# Patient Record
Sex: MALE | Race: ASIAN
[De-identification: names, ages, dates, MRNs, and addresses within clinical notes are randomized per-mention and may not be internally consistent; named-entity substitution may affect disease eponyms.]

---

## 2018-01-24 ENCOUNTER — HOSPITAL ENCOUNTER (EMERGENCY)
Dept: HOSPITAL 76 - ED | Age: 23
Discharge: HOME | End: 2018-01-24
Payer: COMMERCIAL

## 2018-01-24 VITALS — SYSTOLIC BLOOD PRESSURE: 137 MMHG | DIASTOLIC BLOOD PRESSURE: 83 MMHG

## 2018-01-24 DIAGNOSIS — G43.009: Primary | ICD-10-CM

## 2018-01-24 LAB
ALBUMIN DIAFP-MCNC: 4.7 G/DL (ref 3.2–5.5)
ALBUMIN/GLOB SERPL: 1.5 {RATIO} (ref 1–2.2)
ALP SERPL-CCNC: 41 IU/L (ref 42–121)
ALT SERPL W P-5'-P-CCNC: 32 IU/L (ref 10–60)
ANION GAP SERPL CALCULATED.4IONS-SCNC: 12 MMOL/L (ref 6–13)
AST SERPL W P-5'-P-CCNC: 26 IU/L (ref 10–42)
BASOPHILS NFR BLD AUTO: 0 10^3/UL (ref 0–0.1)
BASOPHILS NFR BLD AUTO: 0.7 %
BILIRUB BLD-MCNC: 0.7 MG/DL (ref 0.2–1)
BUN SERPL-MCNC: 12 MG/DL (ref 6–20)
CALCIUM UR-MCNC: 9.5 MG/DL (ref 8.5–10.3)
CHLORIDE SERPL-SCNC: 99 MMOL/L (ref 101–111)
CO2 SERPL-SCNC: 27 MMOL/L (ref 21–32)
CREAT SERPLBLD-SCNC: 0.7 MG/DL (ref 0.6–1.2)
EOSINOPHIL # BLD AUTO: 0.1 10^3/UL (ref 0–0.7)
EOSINOPHIL NFR BLD AUTO: 2.5 %
ERYTHROCYTE [DISTWIDTH] IN BLOOD BY AUTOMATED COUNT: 13.5 % (ref 12–15)
GFRSERPLBLD MDRD-ARVRAT: 142 ML/MIN/{1.73_M2} (ref 89–?)
GLOBULIN SER-MCNC: 3.1 G/DL (ref 2.1–4.2)
GLUCOSE SERPL-MCNC: 87 MG/DL (ref 70–100)
HGB UR QL STRIP: 15.5 G/DL (ref 14–18)
LIPASE SERPL-CCNC: 26 U/L (ref 22–51)
LYMPHOCYTES # SPEC AUTO: 1.1 10^3/UL (ref 1.5–3.5)
LYMPHOCYTES NFR BLD AUTO: 27.2 %
MCH RBC QN AUTO: 27.9 PG (ref 27–31)
MCHC RBC AUTO-ENTMCNC: 34.6 G/DL (ref 32–36)
MCV RBC AUTO: 80.6 FL (ref 80–94)
MONOCYTES # BLD AUTO: 0.7 10^3/UL (ref 0–1)
MONOCYTES NFR BLD AUTO: 17.5 %
NEUTROPHILS # BLD AUTO: 2.1 10^3/UL (ref 1.5–6.6)
NEUTROPHILS # SNV AUTO: 4 X10^3/UL (ref 4.8–10.8)
NEUTROPHILS NFR BLD AUTO: 52.1 %
PDW BLD AUTO: 7.5 FL (ref 7.4–11.4)
PLATELET # BLD: 232 10^3/UL (ref 130–450)
PROT SPEC-MCNC: 7.8 G/DL (ref 6.7–8.2)
RBC MAR: 5.54 10^6/UL (ref 4.7–6.1)
SODIUM SERPLBLD-SCNC: 138 MMOL/L (ref 135–145)

## 2018-01-24 PROCEDURE — 80053 COMPREHEN METABOLIC PANEL: CPT

## 2018-01-24 PROCEDURE — 85651 RBC SED RATE NONAUTOMATED: CPT

## 2018-01-24 PROCEDURE — 96375 TX/PRO/DX INJ NEW DRUG ADDON: CPT

## 2018-01-24 PROCEDURE — 96361 HYDRATE IV INFUSION ADD-ON: CPT

## 2018-01-24 PROCEDURE — 85025 COMPLETE CBC W/AUTO DIFF WBC: CPT

## 2018-01-24 PROCEDURE — 99284 EMERGENCY DEPT VISIT MOD MDM: CPT

## 2018-01-24 PROCEDURE — 70450 CT HEAD/BRAIN W/O DYE: CPT

## 2018-01-24 PROCEDURE — 96374 THER/PROPH/DIAG INJ IV PUSH: CPT

## 2018-01-24 PROCEDURE — 83690 ASSAY OF LIPASE: CPT

## 2018-01-24 PROCEDURE — 36415 COLL VENOUS BLD VENIPUNCTURE: CPT

## 2018-01-24 NOTE — ED PHYSICIAN DOCUMENTATION
PD HPI HEADACHE





- Stated complaint


Stated Complaint: HEADACHE





- Chief complaint


Chief Complaint: Neuro





- History obtained from


History obtained from: Patient





- History of Present Illness


Timing - onset: How many hours ago (1), Today


Timing - onset during: Light activity


Timing - details: Abrupt onset (he had onset of left headache mildly and then 

gradually worse over several minutes, then worsened to point of distracting him 

from walking as he got up and was walking across the street. Denies injury. No 

recent URI. Has nausea and some visual disturbance left lateral eye field.)


Location: Front, Left


Quality: Throbbing, Aching.  No: Thunderclap


Associated symptoms: Nausea, Vision changes.  No: Fever, Stiff neck, Weakness, 

Numbness


Improved by: No: Rest


Worsened by: Light


Contributing factors: No: Anticoagulated, Possible carbon monoxide, Recent 

illness, Trauma


Similar symptoms before: No diagnosis (similar episode that lasted about a day 

several months ago. Interval time without headaches.)


Recently seen: Not recently seen





Review of Systems


Constitutional: denies: Fever, Chills


Nose: denies: Rhinorrhea / runny nose, Congestion


Throat: denies: Sore throat


Cardiac: denies: Chest pain / pressure


Respiratory: denies: Cough


GI: reports: Nausea.  denies: Abdominal Pain, Diarrhea


Skin: denies: Rash, Lesions


Neurologic: denies: Focal weakness, Numbness, Near syncope





PD PAST MEDICAL HISTORY





- Past Medical History


Past Medical History: No


Neuro: None





- Past Surgical History


Past Surgical History: No





- Present Medications


Home Medications: 


 Ambulatory Orders











 Medication  Instructions  Recorded  Confirmed


 


Ibuprofen [Motrin] 600 mg PO TID #20 tab 01/24/18 


 


Ondansetron Odt [Zofran] 4 mg TL Q6H PRN #15 tablet 01/24/18 


 


Tramadol HCl 50 mg PO Q6H PRN #20 tablet 01/24/18 














- Allergies


Allergies/Adverse Reactions: 


 Allergies











Allergy/AdvReac Type Severity Reaction Status Date / Time


 


No Known Drug Allergies Allergy   Verified 01/24/18 13:15














- Social History


Does the pt smoke?: No


Smoking Status: Never smoker





- Immunizations


Immunizations are current?: Yes





PD ED PE NORMAL





- Vitals


Vital signs reviewed: Yes





- General


General: Alert and oriented X 3, No acute distress, Well developed/nourished





- HEENT


HEENT: PERRL, EOMI (light sensitive), Pharynx benign





- Neck


Neck: Supple, no meningeal sign, No adenopathy





- Cardiac


Cardiac: RRR, No murmur





- Respiratory


Respiratory: Clear bilaterally





- Abdomen


Abdomen: Soft, Non tender





- Back


Back: No CVA TTP





- Derm


Derm: Normal color, Warm and dry, No rash





- Neuro


Neuro: Alert and oriented X 3, CNs 2-12 intact, No motor deficit, No sensory 

deficit, Normal speech, Other


Eye Opening: Spontaneous


Motor: Obeys Commands


Verbal: Oriented


GCS Score: 15





- Psych


Psych: Normal mood, Normal affect





Results





- Vitals


Vitals: 


 Oxygen











O2 Source                      Room air

















- Labs


Labs: 


 Laboratory Tests











  01/24/18 01/24/18 01/24/18





  13:56 13:56 13:56


 


WBC  4.0 L  


 


RBC  5.54  


 


Hgb  15.5  


 


Hct  44.6  


 


MCV  80.6  


 


MCH  27.9  


 


MCHC  34.6  


 


RDW  13.5  


 


Plt Count  232  


 


MPV  7.5  


 


Neut #  2.1  


 


Lymph #  1.1 L  


 


Mono #  0.7  


 


Eos #  0.1  


 


Baso #  0.0  


 


Absolute Nucleated RBC  0.00  


 


Nucleated RBC %  0.1  


 


ESR    1


 


Sodium   138 


 


Potassium   3.5 


 


Chloride   99 L 


 


Carbon Dioxide   27 


 


Anion Gap   12.0 


 


BUN   12 


 


Creatinine   0.7 


 


Estimated GFR (MDRD)   142 


 


Glucose   87 


 


Calcium   9.5 


 


Total Bilirubin   0.7 


 


AST   26 


 


ALT   32 


 


Alkaline Phosphatase   41 L 


 


Total Protein   7.8 


 


Albumin   4.7 


 


Globulin   3.1 


 


Albumin/Globulin Ratio   1.5 


 


Lipase   26 














- Rads (name of study)


  ** head CT


Radiology: Prelim report reviewed (no acute process)





PD MEDICAL DECISION MAKING





- ED course


Complexity details: re-evaluated patient (improved with meds targeting migraine.

), considered differential (sounds like migraine though more quick onset. He is 

here soon after onset and has normal head CT, so shared discussion aobout 

sufficient for SAH and not doing LP based on current literature. ), d/w patient





Departure





- Departure


Disposition: 01 Home, Self Care


Clinical Impression: 


Headache, acute


Qualifiers:


 Headache type: unspecified Intractability: not intractable Qualified Code(s): 

R51 - Headache





Migraine


Qualifiers:


 Migraine type: without aura Status migrainosus presence: without status 

migrainosus Intractability: not intractable Qualified Code(s): G43.009 - 

Migraine without aura, not intractable, without status migrainosus





Condition: Stable


Record reviewed to determine appropriate education?: Yes


Instructions:  ED Headache Migraine


[facility] 


[facility]


Prescriptions: 


Ibuprofen [Motrin] 600 mg PO TID #20 tab


Ondansetron Odt [Zofran] 4 mg TL Q6H PRN #15 tablet


 PRN Reason: Nausea / Vomiting


Tramadol HCl 50 mg PO Q6H PRN #20 tablet


 PRN Reason: Pain


Comments: 


Home and rest the rest of today.  Drink lots of fluids.  This sounds like a 

migraine type headache.  Your CT scan and basic blood tests are normal.  You 

can likely expect other episodic headaches similar to this in the near future.  

This could be weeks or months from now.  If you do not have one that would be 

fine too.  For future headaches like this, you can try ondansetron for nausea 

and ibuprofen and along with that add tramadol if needed for pain.  Follow-up 

with your primary care if these are not adequate for the headaches.  Return if 

other symptoms develop.


Forms:  Activity restrictions


Discharge Date/Time: 01/24/18 15:40

## 2018-01-24 NOTE — CT REPORT
EXAM:

CT HEAD

 

EXAM DATE: 1/24/2018 01:47 PM.

 

CLINICAL HISTORY: Acute headache and dizziness.

 

COMPARISON: None.

 

TECHNIQUE: Multiaxial CT images were obtained from the foramen magnum to the vertex. Reformats: Coron
al. IV contrast: None.

 

In accordance with CT protocol optimization, one or more of the following dose reduction techniques w
ere utilized for this exam: automated exposure control, adjustment of mA and/or KV based on patient s
ize, or use of iterative reconstructive technique.

 

FINDINGS:

Parenchyma: No intraparenchymal hemorrhage. No evidence of mass, midline shift, or CT findings of inf
arction. Gray-white differentiation is distinct. 

 

Extraaxial Spaces: Normal for age. No subdural or epidural collections identified.

 

Ventricles: Normal in size and position.

 

Sinuses and Orbits: Single opacified left posterior ethmoid air cell, paranasal sinuses and temporal 
bone air spaces otherwise appear clear.

 

Bones: No evidence of fracture or calvarial defect.

 

Other: None.

 

IMPRESSION: No CT evidence for acute intracranial abnormality.

 

[facility]

Referring Provider Line: [phone]

 

SITE ID: [id number]

## 2018-11-09 ENCOUNTER — HOSPITAL ENCOUNTER (OUTPATIENT)
Dept: HOSPITAL 76 - DI | Age: 23
Discharge: HOME | End: 2018-11-09
Attending: GENERAL PRACTICE
Payer: COMMERCIAL

## 2018-11-09 DIAGNOSIS — R55: Primary | ICD-10-CM

## 2018-11-09 PROCEDURE — 70553 MRI BRAIN STEM W/O & W/DYE: CPT

## 2018-11-09 NOTE — MRI REPORT
Reason:  SYNCOPE AND COLLAPSE

Procedure Date:  11/09/2018   

Accession Number:  [id number] / [id number]                    

Procedure:  MRI - Brain W/WO CPT Code:  

 

FULL RESULT:

 

 

EXAM:

MRI BRAIN WITHOUT AND WITH CONTRAST

 

EXAM DATE: 11/9/2018 01:32 PM.

 

CLINICAL HISTORY: Syncope and collapse. Possible seizures.

 

COMPARISON: CT head without contrast 01/24/2018.

 

TECHNIQUE: Multiplanar, multisequence T1-weighted and fluid-sensitive MR 

sequences of the brain were performed. Sequences optimized for 

seizure/epilepsy evaluation. Other: 1.5 Carmen. IV Contrast: 7.5 mL IV 

Gadavist.

 

FINDINGS:

Diffusion weighted sequence shows no evidence for acute infarct.  There 

is no mass, mass effect, midline shift or abnormal extraaxial fluid 

collection.

 

Size and configuration of the ventricles are normal.

 

There is a nonenhancing 3.8 mm rounded T2 hyperintensity in the right 

globus pallidus internus posterior inferior lateral to the anterior 

commissure.

 

Signal in the cortex and white matter appears otherwise normal.  

Brainstem and cerebellum appear normal. Normal signal and morphology of 

bilateral hippocampi, amygdala, and parahippocampal gyri. No evidence to 

suggest mesial temporal or hippocampal sclerosis. No evidence to suggest 

cortical dysplasia, or gray matter heterotopia.

 

Major intracranial flow voids appear normal.  Limited evaluation of the 

arteries and dural venous sinus structures on postcontrast imaging 

appears normal.

 

Globes, orbits, optic nerve sheath complex, optic chiasm, pituitary, 

cavernous sinus and Meckel's cave appear normal.

 

Paranasal sinuses and mastoid air cells appear well aerated.

 

Marrow signal and extracranial soft tissue appear normal.

 

Craniocervical junction and visualized upper cervical cord appear 

unremarkable.

IMPRESSION:

1. Nonenhancing, nonspecific 3.8 mm non-mass-like T2 hyperintensity in 

the deep right cerebrum, superimposed over the anterior medial globus 

pallidus internus or genu of the right internal capsule. This is of 

uncertain etiology. Unilateral pallidal lesion is rare, typically from 

trauma or anoxic event and expected to correlate with dystonia. Clinical 

correlation suggested.  Follow-up MRI of the brain without and with 

contrast suggested to exclude early presentation of glioma/neoplasm. 

Other consideration would include atypical demyelinating lesion. 

Occasionally, nonspecific lesions associated with neurofibromatosis type 

I (typically termed "unidentified bright object" in literature) may 

present with this appearance in this location.

2. Otherwise normal MRI of the brain without and with contrast.

 

[facility]

## 2019-02-15 ENCOUNTER — HOSPITAL ENCOUNTER (OUTPATIENT)
Dept: HOSPITAL 76 - EMS | Age: 24
Discharge: ANOTHER HOSPITAL | End: 2019-02-15
Attending: SURGERY
Payer: COMMERCIAL

## 2019-02-15 DIAGNOSIS — R51: ICD-10-CM

## 2019-02-15 DIAGNOSIS — R55: Primary | ICD-10-CM

## 2019-06-07 ENCOUNTER — HOSPITAL ENCOUNTER (EMERGENCY)
Dept: HOSPITAL 76 - ED | Age: 24
Discharge: HOME | End: 2019-06-07
Payer: COMMERCIAL

## 2019-06-07 ENCOUNTER — HOSPITAL ENCOUNTER (OUTPATIENT)
Dept: HOSPITAL 76 - EMS | Age: 24
Discharge: ANOTHER HOSPITAL | End: 2019-06-07
Attending: SURGERY
Payer: COMMERCIAL

## 2019-06-07 VITALS — DIASTOLIC BLOOD PRESSURE: 91 MMHG | SYSTOLIC BLOOD PRESSURE: 136 MMHG

## 2019-06-07 DIAGNOSIS — G40.909: ICD-10-CM

## 2019-06-07 DIAGNOSIS — R56.9: Primary | ICD-10-CM

## 2019-06-07 DIAGNOSIS — R55: Primary | ICD-10-CM

## 2019-06-07 DIAGNOSIS — R51: ICD-10-CM

## 2019-06-07 LAB
ALBUMIN DIAFP-MCNC: 4.9 G/DL (ref 3.2–5.5)
ALBUMIN/GLOB SERPL: 1.5 {RATIO} (ref 1–2.2)
ALP SERPL-CCNC: 46 IU/L (ref 42–121)
ALT SERPL W P-5'-P-CCNC: 24 IU/L (ref 10–60)
ANION GAP SERPL CALCULATED.4IONS-SCNC: 12 MMOL/L (ref 6–13)
AST SERPL W P-5'-P-CCNC: 27 IU/L (ref 10–42)
BASOPHILS NFR BLD AUTO: 0 10^3/UL (ref 0–0.1)
BASOPHILS NFR BLD AUTO: 0.9 %
BILIRUB BLD-MCNC: 0.6 MG/DL (ref 0.2–1)
BUN SERPL-MCNC: 9 MG/DL (ref 6–20)
CALCIUM UR-MCNC: 9.7 MG/DL (ref 8.5–10.3)
CHLORIDE SERPL-SCNC: 103 MMOL/L (ref 101–111)
CO2 SERPL-SCNC: 24 MMOL/L (ref 21–32)
CREAT SERPLBLD-SCNC: 0.8 MG/DL (ref 0.6–1.2)
EOSINOPHIL # BLD AUTO: 0.2 10^3/UL (ref 0–0.7)
EOSINOPHIL NFR BLD AUTO: 3 %
ERYTHROCYTE [DISTWIDTH] IN BLOOD BY AUTOMATED COUNT: 13.4 % (ref 12–15)
GFRSERPLBLD MDRD-ARVRAT: 121 ML/MIN/{1.73_M2} (ref 89–?)
GLOBULIN SER-MCNC: 3.2 G/DL (ref 2.1–4.2)
GLUCOSE SERPL-MCNC: 96 MG/DL (ref 70–100)
HGB UR QL STRIP: 15.6 G/DL (ref 14–18)
LIPASE SERPL-CCNC: 26 U/L (ref 22–51)
LYMPHOCYTES # SPEC AUTO: 0.9 10^3/UL (ref 1.5–3.5)
LYMPHOCYTES NFR BLD AUTO: 17.4 %
MCH RBC QN AUTO: 27.4 PG (ref 27–31)
MCHC RBC AUTO-ENTMCNC: 32.9 G/DL (ref 32–36)
MCV RBC AUTO: 83.3 FL (ref 80–94)
MONOCYTES # BLD AUTO: 0.4 10^3/UL (ref 0–1)
MONOCYTES NFR BLD AUTO: 7.3 %
NEUTROPHILS # BLD AUTO: 3.5 10^3/UL (ref 1.5–6.6)
NEUTROPHILS # SNV AUTO: 4.9 X10^3/UL (ref 4.8–10.8)
NEUTROPHILS NFR BLD AUTO: 71.4 %
PDW BLD AUTO: 7.9 FL (ref 7.4–11.4)
PLATELET # BLD: 250 10^3/UL (ref 130–450)
PROT SPEC-MCNC: 8.1 G/DL (ref 6.7–8.2)
RBC MAR: 5.7 10^6/UL (ref 4.7–6.1)
SODIUM SERPLBLD-SCNC: 139 MMOL/L (ref 135–145)

## 2019-06-07 PROCEDURE — 83690 ASSAY OF LIPASE: CPT

## 2019-06-07 PROCEDURE — 85025 COMPLETE CBC W/AUTO DIFF WBC: CPT

## 2019-06-07 PROCEDURE — 36415 COLL VENOUS BLD VENIPUNCTURE: CPT

## 2019-06-07 PROCEDURE — 70450 CT HEAD/BRAIN W/O DYE: CPT

## 2019-06-07 PROCEDURE — 93005 ELECTROCARDIOGRAM TRACING: CPT

## 2019-06-07 PROCEDURE — 99284 EMERGENCY DEPT VISIT MOD MDM: CPT

## 2019-06-07 PROCEDURE — 80053 COMPREHEN METABOLIC PANEL: CPT

## 2019-06-07 NOTE — CT REPORT
Reason:  headache syncope

Procedure Date:  06/07/2019   

Accession Number:  [id number] / [id number]                    

Procedure:  CT  - HEAD WO CPT Code:  

 

FULL RESULT:

 

 

EXAM:

CT HEAD

 

EXAM DATE: 6/7/2019 12:48 PM.

 

CLINICAL HISTORY: Headache syncope.

 

COMPARISON: HEAD W/O 01/24/2018 1:44 PM.

 

TECHNIQUE: Multiaxial CT images were obtained from the foramen magnum to 

the vertex. Reformats: Sagittal and coronal. IV contrast: None.

 

In accordance with CT protocol optimization, one or more of the following 

dose reduction techniques were utilized for this exam: automated exposure 

control, adjustment of mA and/or KV based on patient size, or use of 

iterative reconstructive technique.

 

FINDINGS:

Parenchyma: No intraparenchymal hemorrhage. No evidence of mass, midline 

shift. Gray-white differentiation is distinct.

 

Extraaxial Spaces: Basal cisterns remain patent. No subdural or epidural 

collections identified.

 

Ventricles: Normal in size and position.

 

Sinuses and Orbits: Imaged paranasal sinuses, orbits, and mastoids show 

no significant abnormality.

 

Bones: No evidence of fracture or calvarial defect.

 

Other: None.

IMPRESSION: No acute intracranial abnormality.

 

[facility]

## 2019-06-07 NOTE — ED PHYSICIAN DOCUMENTATION
PD HPI FOCAL NEURO





- Stated complaint


Stated Complaint: POSTICTAL SIEZURE





- Chief complaint


Chief Complaint: Neuro





- History obtained from


History obtained from: Patient, EMS





- History of Present Illness


Timing - onset: Today (This is a 23-year-old gentleman who since January has 

been having syncopal episodes.  From his history it sounds like he has had some 

sort of tachyarrhythmia and in February he saw cardiologist at [facility] and had an

attempted ablation done but per his description they could not re-created in the

electrophysiology lab so no ablation was done. Since then he has been on 

metoprolol, initially was on twice a day but that made him pass out to do so now

is only taking it once a day.  He was sitting in class today and started to feel

dizzy and passed out.  Per report there was some seizure activity and he woke up

with a headache.  Review of the chart shows that in November of last year he had

an MRI showing a small right deep cerebral lesion, not sure if this is been 

followed up on.)





Review of Systems


Ten Systems: 10 systems reviewed and negative


Constitutional: denies: Fever, Chills


Nose: denies: Rhinorrhea / runny nose, Congestion


Throat: denies: Dental pain / toothache, Sore throat


Cardiac: denies: Chest pain / pressure, Palpitations, Pedal edema, Calf pain


Neurologic: reports: Headache





PD PAST MEDICAL HISTORY





- Past Medical History


Past Medical History: Yes


Cardiovascular: Arrhythmia


Neuro: Seizure disorder





- Past Surgical History


Past Surgical History: No





- Present Medications


Home Medications: 


                                Ambulatory Orders











 Medication  Instructions  Recorded  Confirmed


 


Metoprolol Succinate [Toprol Xl] 25 mg PO DAILY PM 06/07/19 06/07/19














- Allergies


Allergies/Adverse Reactions: 


                                    Allergies











Allergy/AdvReac Type Severity Reaction Status Date / Time


 


No Known Drug Allergies Allergy   Verified 06/07/19 11:00














- Social History


Does the pt smoke?: No


Smoking Status: Never smoker


Does the pt drink ETOH?: Yes


Does the pt have substance abuse?: No





- Immunizations


Immunizations are current?: Yes





- POLST


Patient has POLST: No





PD ED PE NORMAL





- Vitals


Vital signs reviewed: Yes





- General


General: Alert and oriented X 3, No acute distress





- HEENT


HEENT: PERRL, EOMI





- Neck


Neck: Supple, no meningeal sign, No bony TTP





- Cardiac


Cardiac: RRR, No murmur





- Respiratory


Respiratory: No respiratory distress, Clear bilaterally





- Abdomen


Abdomen: Normal bowel sounds, Soft, Non tender





- Extremities


Extremities: No deformity, No tenderness to palpate





- Neuro


Neuro: Alert and oriented X 3, Other (Seems to have subtly decreased strength on

the left side arm and leg and face.)


Eye Opening: Spontaneous


Motor: Obeys Commands


Verbal: Oriented


GCS Score: 15





Results





- Vitals


Vitals: 


                               Vital Signs - 24 hr











  06/07/19 06/07/19 06/07/19





  10:56 12:38 14:00


 


Temperature 97.9 C H  


 


Heart Rate 82 62 57 L


 


Respiratory 14 19 12





Rate   


 


Blood Pressure 142/76 H 142/81 H 147/99 H


 


O2 Saturation 96 97 99








                                     Oxygen











O2 Source                      Room air

















- EKG (time done)


  ** 1105


Rate: Rate (enter#) (83)


Rhythm: NSR


Axis: Normal


Intervals: Normal ID


QRS: Normal


Ischemia: ST elevation c/w repol (mild)


Computer interpretation: Agree with computer





- Labs


Labs: 


                                Laboratory Tests











  06/07/19 06/07/19





  11:30 11:30


 


WBC  4.9 


 


RBC  5.70 


 


Hgb  15.6 


 


Hct  47.5 


 


MCV  83.3 


 


MCH  27.4 


 


MCHC  32.9 


 


RDW  13.4 


 


Plt Count  250 


 


MPV  7.9 


 


Neut # (Auto)  3.5 


 


Lymph # (Auto)  0.9 L 


 


Mono # (Auto)  0.4 


 


Eos # (Auto)  0.2 


 


Baso # (Auto)  0.0 


 


Absolute Nucleated RBC  0.00 


 


Nucleated RBC %  0.0 


 


Sodium   139


 


Potassium   3.7


 


Chloride   103


 


Carbon Dioxide   24


 


Anion Gap   12.0


 


BUN   9


 


Creatinine   0.8


 


Estimated GFR (MDRD)   121


 


Glucose   96


 


Calcium   9.7


 


Total Bilirubin   0.6


 


AST   27


 


ALT   24


 


Alkaline Phosphatase   46


 


Total Protein   8.1


 


Albumin   4.9


 


Globulin   3.2


 


Albumin/Globulin Ratio   1.5


 


Lipase   26














- Rads (name of study)


  ** Ct Head


Radiology: EMP read contemporaneously (NAD)





PD MEDICAL DECISION MAKING





- ED course


ED course: 





23-year-old gentleman with recurrent syncope.  He had what sounds like an SVT 

that they were unable to re-create and ablate.  I confirmed this by speaking 

with his cardiologist.  His cardiologist noted that he has an implantable loop 

recorder and asked us to have the patient interrogate this and send it to him, 

he has an mayank on his phone that does this automatically and I will talk to them 

again after that.  Of note previous MRI showed a small right deep cerebral 

lesion.  CT head shows no changes today but is not clear if that was followed up

on.  I tried to get a hold [place] was no answer

On multiple calls.


Dr. Murcia looked at the interrogation and there were no significant tachycardia 

or bradycardia arrhythmias.





Departure





- Departure


Disposition: 01 Home, Self Care


Clinical Impression: 


Headache, acute


Qualifiers:


 Headache type: unspecified Intractability: not intractable Qualified Code(s): 

R51 - Headache





Syncope


Qualifiers:


 Syncope type: unspecified Qualified Code(s): R55 - Syncope and collapse





Condition: Good


Record reviewed to determine appropriate education?: Yes


Instructions:  ED Fainting Unkn Cause


Comments: 


Your MRI in November he did have an abnormality.  [facility] [employer] needs to do another 

MRI with and without contrast.  Your CAT scan today was normal.  I was unable to

get a hold of your flight surgeon to discuss this.  Return for new worsening 

symptoms.  There was no arrhythmia on your heart monitor today when you passed 

out.  This makes this unlikely to be a heart issue.

## 2019-09-26 ENCOUNTER — HOSPITAL ENCOUNTER (EMERGENCY)
Dept: HOSPITAL 76 - ED | Age: 24
Discharge: HOME | End: 2019-09-26
Payer: COMMERCIAL

## 2019-09-26 VITALS — SYSTOLIC BLOOD PRESSURE: 138 MMHG | DIASTOLIC BLOOD PRESSURE: 69 MMHG

## 2019-09-26 DIAGNOSIS — G43.101: Primary | ICD-10-CM

## 2019-09-26 DIAGNOSIS — R03.0: ICD-10-CM

## 2019-09-26 PROCEDURE — 96372 THER/PROPH/DIAG INJ SC/IM: CPT

## 2019-09-26 PROCEDURE — 99283 EMERGENCY DEPT VISIT LOW MDM: CPT

## 2019-09-26 NOTE — ED PHYSICIAN DOCUMENTATION
PD HPI HEADACHE





- Stated complaint


Stated Complaint: HEADACHE





- Chief complaint


Chief Complaint: Neuro





- History obtained from


History obtained from: Patient





- History of Present Illness


Timing - onset: Today (24-year-old gentleman with history of migraines.  See my 

note from June of this year.  Subsequent to that he said he did have a follow-up

MRI in [place] that was normal.  He takes Topamax for migraine prevention.  

Despite that today he developed a gradual onset global and right-sided headache 

associated with mild confusion, for example he had some word finding 

difficulties.  He denies weakness, numbness, or tingling.  He says in the past 

he is taking Imitrex for this type of symptom with relief.)





Review of Systems


Constitutional: denies: Fever, Chills


Nose: denies: Rhinorrhea / runny nose, Congestion


GI: denies: Nausea, Vomiting, Constipation, Diarrhea





PD PAST MEDICAL HISTORY





- Past Medical History


Cardiovascular: Arrhythmia


Neuro: Seizure disorder





- Past Surgical History


Past Surgical History: No





- Present Medications


Home Medications: 


                                Ambulatory Orders











 Medication  Instructions  Recorded  Confirmed


 


Metoprolol Succinate [Toprol Xl] 25 mg PO DAILY PM 06/07/19 06/07/19


 


SUMAtriptan [Imitrex] 25 mg PO BID PRN #14 tablet 09/26/19 














- Allergies


Allergies/Adverse Reactions: 


                                    Allergies











Allergy/AdvReac Type Severity Reaction Status Date / Time


 


No Known Drug Allergies Allergy   Verified 09/26/19 12:26














- Social History


Does the pt smoke?: No


Smoking Status: Never smoker


Does the pt drink ETOH?: Yes


Does the pt have substance abuse?: No





- Immunizations


Immunizations are current?: Yes





- POLST


Patient has POLST: No





PD ED PE NORMAL





- Vitals


Vital signs reviewed: Yes





- General


General: Alert and oriented X 3, No acute distress





- HEENT


HEENT: PERRL, EOMI





- Neck


Neck: Supple, no meningeal sign, No bony TTP





- Neuro


Neuro: Alert and oriented X 3, CNs 2-12 intact, No motor deficit, No sensory 

deficit, Normal speech


Eye Opening: Spontaneous


Motor: Obeys Commands


Verbal: Oriented


GCS Score: 15





Results





- Vitals


Vitals: 


                               Vital Signs - 24 hr











  09/26/19





  12:26


 


Temperature 36.5 C


 


Heart Rate 65


 


Respiratory 16





Rate 


 


Blood Pressure 135/84 H


 


O2 Saturation 98








                                     Oxygen











O2 Source                      Room air

















PD MEDICAL DECISION MAKING





- ED course


ED course: 





This is a young man with history of migraines.  He has a migraine today despite 

being on Topamax prophylaxis which is caused some confusion.  After subcutaneous

 Imitrex all of his symptoms resolved.





Departure





- Departure


Disposition: 01 Home, Self Care


Clinical Impression: 


Migraine


Qualifiers:


 Migraine type: with aura Status migrainosus presence: with status migrainosus 

Intractability: not intractable Qualified Code(s): G43.101 - Migraine with aura,

 not intractable, with status migrainosus





Condition: Good


Record reviewed to determine appropriate education?: Yes


Instructions:  ED Headache Migraine


Prescriptions: 


SUMAtriptan [Imitrex] 25 mg PO BID PRN #14 tablet


 PRN Reason: Headache


Comments: 


Call your doctor to arrange a follow-up appointment, make the next available 

appointment.  In the interim, return anytime if worse or if new symptoms 

develop.





Your blood pressure was elevated today on check into the emergency department.  

This does not mean that you have hypertension, it is a common phenomenon to come

 to the emergency department and have elevated blood pressure.  I recommend that

 you see your primary care physician within the week to have it rechecked when 

you are feeling better.


Forms:  Activity restrictions

## 2020-01-14 ENCOUNTER — HOSPITAL ENCOUNTER (OUTPATIENT)
Dept: HOSPITAL 76 - EMS | Age: 25
Discharge: ANOTHER HOSPITAL | End: 2020-01-14
Attending: SURGERY
Payer: COMMERCIAL

## 2020-01-14 ENCOUNTER — HOSPITAL ENCOUNTER (EMERGENCY)
Dept: HOSPITAL 76 - ED | Age: 25
Discharge: HOME | End: 2020-01-14
Payer: COMMERCIAL

## 2020-01-14 VITALS — SYSTOLIC BLOOD PRESSURE: 137 MMHG | DIASTOLIC BLOOD PRESSURE: 87 MMHG

## 2020-01-14 DIAGNOSIS — G40.909: Primary | ICD-10-CM

## 2020-01-14 DIAGNOSIS — R41.0: ICD-10-CM

## 2020-01-14 DIAGNOSIS — R56.9: Primary | ICD-10-CM

## 2020-01-14 PROCEDURE — 93005 ELECTROCARDIOGRAM TRACING: CPT

## 2020-01-14 PROCEDURE — 99284 EMERGENCY DEPT VISIT MOD MDM: CPT

## 2020-01-14 NOTE — ED PHYSICIAN DOCUMENTATION
History of Present Illness





- Stated complaint


Stated Complaint: SZ





- Chief complaint


Chief Complaint: Neuro





- Additonal information


Additional information: 





This is a 24-year-old male with a history of migraines and possible seizures 

versus syncopal episodes who presents after seizure-like activity.  Patient has 

been having seizure type episodes for 2 years.  He states that in the past he 

had was may be a tachyarrhythmia, he saw cardiologist at [facility] and they 

attempted to do an ablation but they could not re-create the dysrhythmia so no 

ablation was done. He subsequently had a loop recorder that did not show any 

dysrhythmias, Even during an episode of passing out.  He has seen neurologists 

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 

He had an MRI recently which did not show any abnormalities.  He follows with 

Dr. Fidel Frausto of neurology in [place].  Today at work he had an episode where 

he began to feel funny and then he passed out and he was witnessed to have a 

minute of tonic-clonic type shaking, which then resolved and left him confused 

for around 5 minutes.  He did not bite his tongue did not have any incontinence.

 He now feels completely fine.  He did not hit his head, denies headache or 

chest pain.  He is not on any antiepileptic medications. He reportedly has been 

having these episodes every several months, his last one was in November.





Review of Systems


Constitutional: denies: Fever


Cardiac: denies: Chest pain / pressure


Respiratory: denies: Dyspnea


Skin: denies: Rash


Musculoskeletal: denies: Neck pain


Neurologic: reports: Seizure


Immunocompromised: denies: Immunocompromised





PD PAST MEDICAL HISTORY





- Past Medical History


Cardiovascular: Arrhythmia


Neuro: Seizure disorder





- Past Surgical History


Past Surgical History: No





- Present Medications


Home Medications: 


                                Ambulatory Orders











 Medication  Instructions  Recorded  Confirmed


 


Metoprolol Succinate [Toprol Xl] 25 mg PO DAILY PM 06/07/19 06/07/19


 


SUMAtriptan [Imitrex] 25 mg PO BID PRN #14 tablet 09/26/19 














- Allergies


Allergies/Adverse Reactions: 


                                    Allergies











Allergy/AdvReac Type Severity Reaction Status Date / Time


 


No Known Drug Allergies Allergy   Verified 01/14/20 14:58














- Social History


Does the pt smoke?: No


Smoking Status: Never smoker


Does the pt drink ETOH?: Yes


Does the pt have substance abuse?: No





- Immunizations


Immunizations are current?: Yes





- POLST


Patient has POLST: No





PD ED PE NORMAL





- Vitals


Vital signs reviewed: Yes





- General


General: Alert and oriented X 3, No acute distress





- HEENT


HEENT: PERRL





- Neck


Neck: Supple, no meningeal sign





- Cardiac


Cardiac: RRR, No murmur





- Respiratory


Respiratory: Clear bilaterally





- Abdomen


Abdomen: Normal bowel sounds, Soft, Non tender, Non distended





- Derm


Derm: Warm and dry





- Extremities


Extremities: No deformity





- Neuro


Neuro: Alert and oriented X 3, CNs 2-12 intact, No motor deficit, No sensory 

deficit





- Psych


Psych: Normal mood, Normal affect





Results





- Vitals


Vitals: 


                                     Oxygen











O2 Source                      Room air

















- EKG (time done)


  ** 14:57


Other comments: Other comments (Rate 93, rhythm sinus, there is slight concave 

up ST elevation about 1 mm in V2 and V3 normal for patient's age and sex.  No ST

 depression, intervals are within normal limits.)





PD MEDICAL DECISION MAKING





- ED course


Complexity details: considered differential (Seizure, dysrhtyhmia, complex 

migraine, vasovagal episode)


ED course: 





Pt presents wtih an episode that does sound like a seizure given a post-ictal 

period and an estimated 1 minute of clonic movements. He has no tongue 

laceration, no urinary incontinence. He gets these episodes every several months

 and had has cardiology work up as well as neurology work up. EKG today shows no

 signs of dysrhtymia or ischemia. He has no chest pain, no shortness of breath, 

no neurologic abnormalities and feels completely fine at this time. He has been 

diagnosed with migraines, and reportedly has been told that these are not true 

seizures. I was unable to contact his neurologist office as they are closed. I 

discussed that he needs to follow closely with them and discuss these episodes 

that he is having every several months, from the description I think seizure is 

possible. Pt agrees and will call tomorrow. Given he is feeling well and at 

baseline, neuro exam is normal, vitals are normal, and this is a chronic issue 

with muiitple past work ups, he appears appropriate for outpatient follow up. I 

reviewed return precautions and seizure precautions (no driving, climbing at 

heights, swimming, etc) and pt was discharged in the care of a colleague/LPO. 





Departure





- Departure


Disposition: 01 Home, Self Care


Clinical Impression: 


 Seizure-like activity





Condition: Good


Comments: 


You were seen today after seizure-like activity.  Based on the description of 

what happened, I am concerned that you have had a seizure - you had rhythmic 

shaking followed by confusion afterwards. It is possible to have seizures even 

with a normal MRI. Please follow-up with your neurologist as soon as possible.  

I am concerned that you have have had these episodes every several months, and 

given they do sound like seizures, I think it be reasonable to consider starting

 you on an antiepileptic medication.  Please call your neurologist tomorrow to 

discuss these episodes and if medication might be needed. If you are having any 

worsening such as another episode, chest pain, confusion, or severe headache, 

return to the emergency department. You should not drive, climb at heights, or 

swim unsupervised until you are cleared by your neurologist.


Discharge Date/Time: 01/14/20 17:14

## 2020-02-27 ENCOUNTER — HOSPITAL ENCOUNTER (OUTPATIENT)
Dept: HOSPITAL 76 - EMS | Age: 25
Discharge: ANOTHER HOSPITAL | End: 2020-02-27
Attending: SURGERY
Payer: COMMERCIAL

## 2020-02-27 ENCOUNTER — HOSPITAL ENCOUNTER (EMERGENCY)
Dept: HOSPITAL 76 - ED | Age: 25
Discharge: HOME | End: 2020-02-27
Payer: COMMERCIAL

## 2020-02-27 VITALS — DIASTOLIC BLOOD PRESSURE: 74 MMHG | SYSTOLIC BLOOD PRESSURE: 143 MMHG

## 2020-02-27 DIAGNOSIS — K29.70: ICD-10-CM

## 2020-02-27 DIAGNOSIS — R56.9: Primary | ICD-10-CM

## 2020-02-27 DIAGNOSIS — R11.10: ICD-10-CM

## 2020-02-27 DIAGNOSIS — K92.0: ICD-10-CM

## 2020-02-27 LAB
ALBUMIN DIAFP-MCNC: 5.2 G/DL (ref 3.2–5.5)
ALBUMIN/GLOB SERPL: 1.5 {RATIO} (ref 1–2.2)
ALP SERPL-CCNC: 49 IU/L (ref 42–121)
ALT SERPL W P-5'-P-CCNC: 44 IU/L (ref 10–60)
ANION GAP SERPL CALCULATED.4IONS-SCNC: 16 MMOL/L (ref 6–13)
AST SERPL W P-5'-P-CCNC: 32 IU/L (ref 10–42)
BASOPHILS NFR BLD AUTO: 0.1 10^3/UL (ref 0–0.1)
BASOPHILS NFR BLD AUTO: 0.8 %
BILIRUB BLD-MCNC: 0.8 MG/DL (ref 0.2–1)
BUN SERPL-MCNC: 19 MG/DL (ref 6–20)
CALCIUM UR-MCNC: 10.1 MG/DL (ref 8.5–10.3)
CHLORIDE SERPL-SCNC: 101 MMOL/L (ref 101–111)
CO2 SERPL-SCNC: 20 MMOL/L (ref 21–32)
CREAT SERPLBLD-SCNC: 1 MG/DL (ref 0.6–1.2)
EOSINOPHIL # BLD AUTO: 0.3 10^3/UL (ref 0–0.7)
EOSINOPHIL NFR BLD AUTO: 2.6 %
ERYTHROCYTE [DISTWIDTH] IN BLOOD BY AUTOMATED COUNT: 12.9 % (ref 12–15)
GFRSERPLBLD MDRD-ARVRAT: 93 ML/MIN/{1.73_M2} (ref 89–?)
GLOBULIN SER-MCNC: 3.4 G/DL (ref 2.1–4.2)
GLUCOSE SERPL-MCNC: 112 MG/DL (ref 70–100)
HGB UR QL STRIP: 17.3 G/DL (ref 14–18)
INR PPP: 1 (ref 0.8–1.2)
LIPASE SERPL-CCNC: 28 U/L (ref 22–51)
LYMPHOCYTES # SPEC AUTO: 2.2 10^3/UL (ref 1.5–3.5)
LYMPHOCYTES NFR BLD AUTO: 22.5 %
MCH RBC QN AUTO: 28.3 PG (ref 27–31)
MCHC RBC AUTO-ENTMCNC: 34.6 G/DL (ref 32–36)
MCV RBC AUTO: 81.7 FL (ref 80–94)
MONOCYTES # BLD AUTO: 0.7 10^3/UL (ref 0–1)
MONOCYTES NFR BLD AUTO: 7.2 %
NEUTROPHILS # BLD AUTO: 6.2 10^3/UL (ref 1.5–6.6)
NEUTROPHILS # SNV AUTO: 9.6 X10^3/UL (ref 4.8–10.8)
NEUTROPHILS NFR BLD AUTO: 64.9 %
PDW BLD AUTO: 9.2 FL (ref 7.4–11.4)
PLATELET # BLD: 278 10^3/UL (ref 130–450)
PROT SPEC-MCNC: 8.6 G/DL (ref 6.7–8.2)
PROTHROM ACT/NOR PPP: 11.1 SECS (ref 9.9–12.6)
RBC MAR: 6.12 10^6/UL (ref 4.7–6.1)
SODIUM SERPLBLD-SCNC: 137 MMOL/L (ref 135–145)

## 2020-02-27 PROCEDURE — 80053 COMPREHEN METABOLIC PANEL: CPT

## 2020-02-27 PROCEDURE — 83690 ASSAY OF LIPASE: CPT

## 2020-02-27 PROCEDURE — 99284 EMERGENCY DEPT VISIT MOD MDM: CPT

## 2020-02-27 PROCEDURE — 85610 PROTHROMBIN TIME: CPT

## 2020-02-27 PROCEDURE — 96374 THER/PROPH/DIAG INJ IV PUSH: CPT

## 2020-02-27 PROCEDURE — 70450 CT HEAD/BRAIN W/O DYE: CPT

## 2020-02-27 PROCEDURE — 85025 COMPLETE CBC W/AUTO DIFF WBC: CPT

## 2020-02-27 PROCEDURE — 36415 COLL VENOUS BLD VENIPUNCTURE: CPT

## 2020-02-27 NOTE — CT REPORT
Reason:  head trauma, seizure

Procedure Date:  02/27/2020   

Accession Number:  [id number] / [id number]                    

Procedure:  CT  - HEAD WO CPT Code:  

 

***Final Report***

 

 

FULL RESULT:

 

 

EXAM:

CT HEAD

 

EXAM DATE: 2/27/2020 05:20 PM.

 

CLINICAL HISTORY: Head trauma, seizure.

 

COMPARISON: HEAD HEAD 01 [facility] HEAD WITHOUT (ADULT) 11/23/2019 7:18 PM.

 

TECHNIQUE: Multiaxial CT images were obtained from the foramen magnum to 

the vertex. Reformats: Sagittal and coronal. IV contrast: None.

 

In accordance with CT protocol optimization, one or more of the following 

dose reduction techniques were utilized for this exam: automated exposure 

control, adjustment of mA and/or KV based on patient size, or use of 

iterative reconstructive technique.

 

FINDINGS:

Parenchyma: No intraparenchymal hemorrhage. No evidence of mass, midline 

shift, or CT findings of infarction. Gray-white differentiation is 

distinct.

 

Extraaxial Spaces: Normal for age. No subdural or epidural collections 

identified.

 

Ventricles: Normal in size and position.

 

Sinuses and Orbits: There is a single opacified posterior left ethmoid 

air cell, as before. Otherwise, the visualized paranasal sinuses, orbits, 

and mastoids show no significant abnormality.

 

Bones: No evidence of fracture or calvarial defect.

 

Other: No scalp contusion is identified.

IMPRESSION: Normal head CT.

 

RADIA

## 2020-02-27 NOTE — ED PHYSICIAN DOCUMENTATION
History of Present Illness





- Stated complaint


Stated Complaint: SZ





- Chief complaint


Chief Complaint: Neuro





- History obtained from


History obtained from: Patient, EMS





- History of Present Illness


Timing: Prior to arrival





- Additonal information


Additional information: 





Patient is brought to the emergency department by EMS after having a seizure 

while doing physical fitness at the [employer], where he works.  Patient states 

that he does not remember the episode at all.  His friend states that patient 

was on an exercise bike ahead of him and that he suddenly stiffened up and fell 

off the bike.  He began to shake and his face was twitching.  Friend states that

the patient's episode lasted about 1 minute he thinks, but he was not paying 

close attention.  The patient states that he has had seizure-like activity 

previously, but that it is usually associated with migraine headaches, and that 

since he has been treated with Botox for his migraines, he has not been having 

Migraines and seizures.  Patient has been vomiting coffee-ground type emesis in 

route, medics state.  Patient states that he does not have any history of any 

gastritis or ulcers, and denies any drinking of alcohol recently.  In fact, he 

states he is not supposed to drink.  No abdominal pain.  No chest pain.  No 

shortness of breath.  Patient states his neck and head do not hurt.  No other 

complaints at this time.  Medics state the patient seemed to be postictal when 

they first picked him up but that he has been becoming more alert and conversant

throughout the ambulance ride.





Review of Systems


Ten Systems: 10 systems reviewed and negative


Constitutional: reports: Reviewed and negative


Eyes: reports: Reviewed and negative


Ears: reports: Reviewed and negative


Nose: reports: Reviewed and negative


Throat: reports: Reviewed and negative


Cardiac: reports: Reviewed and negative


Respiratory: reports: Reviewed and negative


GI: reports: Nausea, Vomiting


: reports: Reviewed and negative


Skin: reports: Reviewed and negative


Musculoskeletal: reports: Reviewed and negative


Neurologic: reports: Seizure


Psychiatric: reports: Reviewed and negative


Endocrine: reports: Reviewed and negative


Immunocompromised: reports: Reviewed and negative





PD PAST MEDICAL HISTORY





- Past Medical History


Past Medical History: Yes


Cardiovascular: Arrhythmia


Neuro: Seizure disorder





- Past Surgical History


Past Surgical History: Yes


Cardiovascular: Other





- Present Medications


Home Medications: 


                                Ambulatory Orders











 Medication  Instructions  Recorded  Confirmed


 


Metoprolol Succinate [Toprol Xl] 25 mg PO DAILY PM 06/07/19 06/07/19


 


SUMAtriptan [Imitrex] 25 mg PO BID PRN #14 tablet 09/26/19 


 


Omeprazole 20 mg PO DAILY 30 Days #30 02/27/20 





 capsule.  


 


Ondansetron Odt [Zofran Odt] 4 mg TL Q6H PRN #10 tablet 02/27/20 














- Allergies


Allergies/Adverse Reactions: 


                                    Allergies











Allergy/AdvReac Type Severity Reaction Status Date / Time


 


No Known Drug Allergies Allergy   Verified 01/14/20 14:58














- Social History


Does the pt smoke?: No


Smoking Status: Never smoker


Does the pt drink ETOH?: Yes


Does the pt have substance abuse?: No





- Immunizations


Immunizations are current?: Yes





- POLST


Patient has POLST: No





PD ED PE NORMAL





- Vitals


Vital signs reviewed: Yes





- General


General: No acute distress





- HEENT


HEENT: PERRL





- Neck


Neck: Supple, no meningeal sign





- Cardiac


Cardiac: RRR, No murmur





- Respiratory


Respiratory: Clear bilaterally





- Abdomen


Abdomen: Soft, Non tender, Non distended, Other (Patient is vomiting coffee-

ground type emesis intermittently.)





- Derm


Derm: Warm and dry





- Extremities


Extremities: No deformity





- Neuro


Neuro: Alert and oriented X 3





- Psych


Psych: Normal mood, Normal affect





Results





- Vitals


Vitals: 


                               Vital Signs - 24 hr











  02/27/20 02/27/20 02/27/20





  16:40 17:20 18:21


 


Temperature 36.3 C L  


 


Heart Rate 110 H 100 110 H


 


Respiratory 19 18 20





Rate   


 


Blood Pressure 134/75 H 133/73 H 143/74 H


 


O2 Saturation 98 99 99








                                     Oxygen











O2 Source                      Room air

















- Labs


Labs: 


                                Laboratory Tests











  02/27/20 02/27/20 02/27/20





  17:01 17:01 17:01


 


WBC  9.6  


 


RBC  6.12 H  


 


Hgb  17.3  


 


Hct  50.0  


 


MCV  81.7  


 


MCH  28.3  


 


MCHC  34.6  


 


RDW  12.9  


 


Plt Count  278  


 


MPV  9.2  


 


Neut # (Auto)  6.2  


 


Lymph # (Auto)  2.2  


 


Mono # (Auto)  0.7  


 


Eos # (Auto)  0.3  


 


Baso # (Auto)  0.1  


 


Absolute Nucleated RBC  0.00  


 


Nucleated RBC %  0.0  


 


PT    11.1


 


INR    1.0


 


Sodium   137 


 


Potassium   3.0 L 


 


Chloride   101 


 


Carbon Dioxide   20 L 


 


Anion Gap   16.0 H 


 


BUN   19 


 


Creatinine   1.0 


 


Estimated GFR (MDRD)   93 


 


Glucose   112 H 


 


Calcium   10.1 


 


Total Bilirubin   0.8 


 


AST   32 


 


ALT   44 


 


Alkaline Phosphatase   49 


 


Total Protein   8.6 H 


 


Albumin   5.2 


 


Globulin   3.4 


 


Albumin/Globulin Ratio   1.5 


 


Lipase   28 














PD MEDICAL DECISION MAKING





- ED course


Complexity details: reviewed old records, reviewed results, re-evaluated 

patient, considered differential, d/w patient


ED course: 





Patient was given a liter of 0.0 normal saline in the emergency permit, as well 

as Zofran and Protonix.  He was worked up with labs and a CT scan of the brain. 

Work-up was unremarkable, and patient was found to have a normal H&H.On 

reevaluation, patient was found to be feeling much better.  Taking ibuprofen or 

drinking alcohol, but he did note that he does drink coffee regularly.  I 

discussed the risk factors for gastritis with the patient, and of advised him to

avoid coffee, alcohol, NSAIDs, spicy foods or acidic foods for the time being.  

I discussed with him that if he develops melena or that if he vomits blood again

he will need to be evaluated with scope.Patient has an existing history of 

seizures, though these have generally been associate with migraine headaches.  

If he develops increasing frequency of seizures or seizures with new triggers, 

he should follow-up with neurology.  We have discussed this.We have discussed 

the usual indications for return to the emergency department, as well.





Departure





- Departure


Disposition: 01 Home, Self Care


Clinical Impression: 


 Seizure, Vomiting, Gastritis





Condition: Fair


Instructions:  ED Gastritis, ED Seizure Recurrent, ED Nausea Vomiting


Prescriptions: 


Omeprazole 20 mg PO DAILY 30 Days #30 capsule.


Ondansetron Odt [Zofran Odt] 4 mg TL Q6H PRN #10 tablet


 PRN Reason: Nausea / Vomiting


Comments: 


Your labs and head CT look good.  Is not clear why you had the seizure-like 

activity today.  You will need to follow-up with your doctor through the [facility] to

have further evaluation of this issue and decide if you need to be on medication

for this.Please take the medication for nausea and to calm your stomach lining, 

as prescribed.  You were found to have small bits of clotted blood in your vomit

today, and if this continues, you will also need to follow-up for further 

evaluation of this issue.


Discharge Date/Time: 02/27/20 18:38

## 2020-06-20 ENCOUNTER — HOSPITAL ENCOUNTER (EMERGENCY)
Dept: HOSPITAL 76 - ED | Age: 25
Discharge: HOME | End: 2020-06-20
Payer: COMMERCIAL

## 2020-06-20 VITALS — DIASTOLIC BLOOD PRESSURE: 89 MMHG | SYSTOLIC BLOOD PRESSURE: 152 MMHG

## 2020-06-20 DIAGNOSIS — S61.411A: Primary | ICD-10-CM
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
## 2020-06-20 PROCEDURE — 12001 RPR S/N/AX/GEN/TRNK 2.5CM/<: CPT

## 2020-06-20 PROCEDURE — 99282 EMERGENCY DEPT VISIT SF MDM: CPT

## 2020-06-20 PROCEDURE — 99283 EMERGENCY DEPT VISIT LOW MDM: CPT

## 2020-06-20 NOTE — ED PHYSICIAN DOCUMENTATION
History of Present Illness





- Stated complaint


Stated Complaint: RT HAND LAC





- Chief complaint


Chief Complaint: Laceration





- History obtained from


History obtained from: Patient (the patient is a 25 y/o AD USN M R hand dominant

male with a cc of right hand laceration while in the kitchen. denies any other 

complaints.)





Review of Systems


Constitutional: reports: Reviewed and negative


Eyes: reports: Reviewed and negative


Ears: reports: Reviewed and negative


Nose: reports: Reviewed and negative


Throat: reports: Reviewed and negative


Cardiac: reports: Reviewed and negative


Respiratory: reports: Reviewed and negative


GI: reports: Reviewed and negative


: reports: Reviewed and negative


Skin: reports: Laceration (s)


Musculoskeletal: reports: Reviewed and negative


Neurologic: reports: Reviewed and negative


Psychiatric: reports: Reviewed and negative


Endocrine: reports: Reviewed and negative


Immunocompromised: reports: Reviewed and negative





PD PAST MEDICAL HISTORY





- Past Medical History


Cardiovascular: Arrhythmia


Neuro: Seizure disorder





- Past Surgical History


Past Surgical History: Yes


Cardiovascular: Other





- Present Medications


Home Medications: 


                                Ambulatory Orders











 Medication  Instructions  Recorded  Confirmed


 


Metoprolol Succinate [Toprol Xl] 25 mg PO DAILY PM 06/07/19 06/07/19


 


SUMAtriptan [Imitrex] 25 mg PO BID PRN #14 tablet 09/26/19 


 


Omeprazole 20 mg PO DAILY 30 Days #30 02/27/20 





 capsule.  


 


Ondansetron Odt [Zofran Odt] 4 mg TL Q6H PRN #10 tablet 02/27/20 














- Allergies


Allergies/Adverse Reactions: 


                                    Allergies











Allergy/AdvReac Type Severity Reaction Status Date / Time


 


No Known Drug Allergies Allergy   Verified 06/20/20 20:59














- Social History


Does the pt smoke?: No


Smoking Status: Never smoker


Does the pt drink ETOH?: Yes


Does the pt have substance abuse?: No





- Immunizations


Immunizations are current?: Yes





- POLST


Patient has POLST: No





PD ED PE NORMAL





- Vitals


Vital signs reviewed: Yes





- General


General: Alert and oriented X 3, No acute distress





- HEENT


HEENT: PERRL





- Neck


Neck: Supple, no meningeal sign





- Cardiac


Cardiac: RRR, No murmur





- Respiratory


Respiratory: Clear bilaterally





- Abdomen


Abdomen: Normal bowel sounds, Soft, Non tender, Non distended





- Derm


Derm: Warm and dry, Other (1 cm laceration to the palmar aspect of the right 

hand between the index finger and thumb.  Radian, median, ulnar motor and 

sensory exam are intact all range of motion of the thumb is intact to include 

opposition)





- Extremities


Extremities: No deformity, Other (1 cm laceration on the palmar aspect of the 

right hand between the thumb and index finger.  Radian, median, ulnar motor and 

sensory exam are tach  strength is 5 out of 5 sensations intact light touch 

compartments are soft neurovascularly intact full range of motion on opposition 

and opposition o)





- Neuro


Neuro: Alert and oriented X 3





- Psych


Psych: Normal mood, Normal affect





Results





- Vitals


Vitals: 


                               Vital Signs - 24 hr











  06/20/20 06/20/20





  20:59 22:07


 


Temperature 36.6 C 37.4 C


 


Heart Rate 109 H 73


 


Respiratory 14 12





Rate  


 


Blood Pressure 142/83 H 152/89 H


 


O2 Saturation 98 98








                                     Oxygen











O2 Source                      Room air

















Procedures





- Laceration (location)


  ** Hand right Palmar


Length in cm: 1.5


Wound type: Linear


Neurovascular status: Sensory intact, Motor intact, Vascular intact


Tendon involvement: Tendon intact


Anesthesia: Lidocaine 1% with epi


Wound Preparation: Irrigated copiously NS, Other (No foreign body identified)


Skin layer closure: Nylon, Size #-0 - enter number (4), Sutures - enter # (2)


Other: Patient tolerated well, No complications, Neurovascular intact, Dressing 

applied, Tetanus UTD


Complexity: Simple





PD MEDICAL DECISION MAKING





- ED course


Complexity details: considered differential (Hand laceration)





Departure





- Departure


Disposition: 01 Home, Self Care


Clinical Impression: 


Hand laceration


Qualifiers:


 Encounter type: initial encounter Foreign body presence: without foreign body 

Laterality: right Qualified Code(s): S61.411A - Laceration without foreign body 

of right hand, initial encounter





Condition: Stable


Instructions:  ED Laceration Hand


Follow-Up: 


Karl Huston MD [Primary Care Provider] - Within 1 week


Comments: 


follow up in 7-10 days for suture removal. keep wound clean dry and protected.


Discharge Date/Time: 06/20/20 22:07

## 2020-09-21 ENCOUNTER — HOSPITAL ENCOUNTER (EMERGENCY)
Dept: HOSPITAL 76 - ED | Age: 25
Discharge: HOME | End: 2020-09-21
Payer: COMMERCIAL

## 2020-09-21 VITALS — DIASTOLIC BLOOD PRESSURE: 78 MMHG | SYSTOLIC BLOOD PRESSURE: 129 MMHG

## 2020-09-21 DIAGNOSIS — Z91.138: ICD-10-CM

## 2020-09-21 DIAGNOSIS — T42.6X6A: ICD-10-CM

## 2020-09-21 DIAGNOSIS — G40.909: Primary | ICD-10-CM

## 2020-09-21 LAB
ANION GAP SERPL CALCULATED.4IONS-SCNC: 12 MMOL/L (ref 6–13)
BUN SERPL-MCNC: 13 MG/DL (ref 6–20)
CALCIUM UR-MCNC: 10 MG/DL (ref 8.5–10.3)
CHLORIDE SERPL-SCNC: 102 MMOL/L (ref 101–111)
CO2 SERPL-SCNC: 25 MMOL/L (ref 21–32)
CREAT SERPLBLD-SCNC: 1 MG/DL (ref 0.6–1.2)
GLUCOSE SERPL-MCNC: 86 MG/DL (ref 70–100)
SODIUM SERPLBLD-SCNC: 139 MMOL/L (ref 135–145)

## 2020-09-21 PROCEDURE — 36415 COLL VENOUS BLD VENIPUNCTURE: CPT

## 2020-09-21 PROCEDURE — 99283 EMERGENCY DEPT VISIT LOW MDM: CPT

## 2020-09-21 PROCEDURE — 80048 BASIC METABOLIC PNL TOTAL CA: CPT

## 2020-09-21 NOTE — ED PHYSICIAN DOCUMENTATION
History of Present Illness





- Stated complaint


Stated Complaint: SZ





- Chief complaint


Chief Complaint: Neuro





- History obtained from


History obtained from: Patient





- Additonal information


Additional information: 


Patient comes emergency department for chief complaint of breakthrough seizure. 

Patient has a longstanding history of seizure disorder and normally takes 

lamotrigine, but states that he has been out of it for the last couple of weeks 

and needs to  a refill tomorrow.  Patient states that he has not had any 

head injuries.  No recent vomiting or he states he is otherwise healthy and d

enies any other complaints at this time.








Review of Systems


Ten Systems: 10 systems reviewed and negative


Constitutional: reports: Reviewed and negative


Eyes: reports: Reviewed and negative


Ears: reports: Reviewed and negative


Nose: reports: Reviewed and negative


Throat: reports: Reviewed and negative


Cardiac: reports: Reviewed and negative


Respiratory: reports: Reviewed and negative


GI: reports: Reviewed and negative


: reports: Reviewed and negative


Skin: reports: Reviewed and negative


Musculoskeletal: reports: Reviewed and negative


Neurologic: reports: Seizure


Psychiatric: reports: Reviewed and negative


Endocrine: reports: Reviewed and negative


Immunocompromised: reports: Reviewed and negative





PD PAST MEDICAL HISTORY





- Past Medical History


Cardiovascular: Arrhythmia


Respiratory: None


Neuro: Seizure disorder


Endocrine/Autoimmune: None


GI: None


: None


Psych: None


Musculoskeletal: None


Derm: None





- Past Surgical History


Past Surgical History: Yes


Cardiovascular: Other





- Present Medications


Home Medications: 


                                Ambulatory Orders











 Medication  Instructions  Recorded  Confirmed


 


Metoprolol Succinate [Toprol Xl] 25 mg PO DAILY PM 06/07/19 09/21/20


 


Acetaminophen [Tylenol] 1 tab PO DAILY 09/21/20 09/21/20


 


Lamotrigine [Lamotrigine ER] 1 tab PO DAILY 09/21/20 09/21/20


 


Rizatriptan Benzoate [Rizatriptan] 1 tab PO DAILY 09/21/20 09/21/20














- Allergies


Allergies/Adverse Reactions: 


                                    Allergies











Allergy/AdvReac Type Severity Reaction Status Date / Time


 


No Known Drug Allergies Allergy   Verified 09/21/20 00:32














- Social History


Does the pt smoke?: No


Smoking Status: Never smoker


Does the pt drink ETOH?: Yes


Does the pt have substance abuse?: No





- Immunizations


Immunizations are current?: Yes





- POLST


Patient has POLST: No





PD ED PE NORMAL





- Vitals


Vital signs reviewed: Yes





- General


General: Alert and oriented X 3, No acute distress





- HEENT


HEENT: Atraumatic, PERRL, EOMI, Moist mucous membranes





- Neck


Neck: Supple, no meningeal sign





- Cardiac


Cardiac: RRR, No murmur





- Respiratory


Respiratory: No respiratory distress, Clear bilaterally





- Abdomen


Abdomen: Soft, Non tender, Non distended





- Derm


Derm: Normal color, Warm and dry





- Extremities


Extremities: No deformity, No edema, No calf tenderness / cord





- Neuro


Neuro: Alert and oriented X 3, CNs 2-12 intact, No motor deficit, No sensory 

deficit, Normal speech





- Psych


Psych: Normal mood, Normal affect





Results





- Vitals


Vitals: 


                               Vital Signs - 24 hr











  09/21/20 09/21/20 09/21/20





  00:28 01:15 02:03


 


Temperature 36.6 C 36.7 C 36.9 C


 


Heart Rate 88 74 75


 


Respiratory 16 16 16





Rate   


 


Blood Pressure 138/83 H 129/79 129/78


 


O2 Saturation 98 98 98








                                     Oxygen











O2 Source                      Room air

















- Labs


Labs: 


                                Laboratory Tests











  09/21/20





  00:25


 


Sodium  139


 


Potassium  3.7


 


Chloride  102


 


Carbon Dioxide  25


 


Anion Gap  12.0


 


BUN  13


 


Creatinine  1.0


 


Estimated GFR (MDRD)  92


 


Glucose  86


 


Calcium  10.0














PD MEDICAL DECISION MAKING





- ED course


Complexity details: reviewed results, re-evaluated patient, considered 

differential, d/w patient


ED course: 


A BMP was checked to evaluate patient's electrolytes, and patient was observed 

in the emergency department found to have no further seizure activity.  I have 

given the patient a dose of Lamictal here and have advised him to  his 

Lamictal tomorrow as planned.  We discussed the usual indications for return.








Departure





- Departure


Disposition: 01 Home, Self Care


Clinical Impression: 


 Breakthrough seizure





Condition: Stable


Instructions:  ED Seizure Recurrent


Comments: 


Your bloodwork looks good.  Please get back on your Lamotrigine.  You have been 

given a dose in the emergency department tonight.


Discharge Date/Time: 09/21/20 02:04